# Patient Record
Sex: FEMALE | Race: WHITE | URBAN - METROPOLITAN AREA
[De-identification: names, ages, dates, MRNs, and addresses within clinical notes are randomized per-mention and may not be internally consistent; named-entity substitution may affect disease eponyms.]

---

## 2024-05-06 ENCOUNTER — OFFICE VISIT (OUTPATIENT)
Dept: OBGYN CLINIC | Facility: CLINIC | Age: 44
End: 2024-05-06

## 2024-05-06 VITALS
HEIGHT: 60 IN | WEIGHT: 143 LBS | DIASTOLIC BLOOD PRESSURE: 86 MMHG | SYSTOLIC BLOOD PRESSURE: 130 MMHG | BODY MASS INDEX: 28.07 KG/M2 | HEART RATE: 76 BPM

## 2024-05-06 DIAGNOSIS — Z59.41 FOOD INSECURITY: ICD-10-CM

## 2024-05-06 DIAGNOSIS — Z12.31 ENCOUNTER FOR SCREENING MAMMOGRAM FOR MALIGNANT NEOPLASM OF BREAST: ICD-10-CM

## 2024-05-06 DIAGNOSIS — Z60.3 LANGUAGE BARRIER: ICD-10-CM

## 2024-05-06 DIAGNOSIS — Z59.9 FINANCIAL DIFFICULTIES: ICD-10-CM

## 2024-05-06 DIAGNOSIS — Z59.82 INABILITY TO ACQUIRE TRANSPORTATION: ICD-10-CM

## 2024-05-06 DIAGNOSIS — Z01.419 ENCOUNTER FOR GYNECOLOGICAL EXAMINATION WITHOUT ABNORMAL FINDING: Primary | ICD-10-CM

## 2024-05-06 DIAGNOSIS — Z75.8 LANGUAGE BARRIER: ICD-10-CM

## 2024-05-06 DIAGNOSIS — Z30.42 ENCOUNTER FOR SURVEILLANCE OF INJECTABLE CONTRACEPTIVE: ICD-10-CM

## 2024-05-06 PROCEDURE — G0124 SCREEN C/V THIN LAYER BY MD: HCPCS | Performed by: STUDENT IN AN ORGANIZED HEALTH CARE EDUCATION/TRAINING PROGRAM

## 2024-05-06 PROCEDURE — 99396 PREV VISIT EST AGE 40-64: CPT | Performed by: NURSE PRACTITIONER

## 2024-05-06 PROCEDURE — G0476 HPV COMBO ASSAY CA SCREEN: HCPCS | Performed by: NURSE PRACTITIONER

## 2024-05-06 PROCEDURE — G0145 SCR C/V CYTO,THINLAYER,RESCR: HCPCS | Performed by: STUDENT IN AN ORGANIZED HEALTH CARE EDUCATION/TRAINING PROGRAM

## 2024-05-06 RX ORDER — MEDROXYPROGESTERONE ACETATE 150 MG/ML
150 INJECTION, SUSPENSION INTRAMUSCULAR
Qty: 1 ML | Refills: 4 | Status: SHIPPED | OUTPATIENT
Start: 2024-05-06

## 2024-05-06 SDOH — SOCIAL STABILITY - SOCIAL INSECURITY: ACCULTURATION DIFFICULTY: Z60.3

## 2024-05-06 SDOH — ECONOMIC STABILITY - FOOD INSECURITY: FOOD INSECURITY: Z59.41

## 2024-05-06 SDOH — ECONOMIC STABILITY - TRANSPORTATION SECURITY: TRANSPORTATION INSECURITY: Z59.82

## 2024-05-06 SDOH — ECONOMIC STABILITY - INCOME SECURITY: PROBLEM RELATED TO HOUSING AND ECONOMIC CIRCUMSTANCES, UNSPECIFIED: Z59.9

## 2024-05-06 NOTE — PROGRESS NOTES
ASSESSMENT & PLAN: Diana Abreu is a 44 y.o.  with normal gynecologic exam.    1.  Routine well woman exam done today  2.  Pap and HPV:  The patient's last pap and hpv was years ago, unsure.    It was normal.    Pap and cotesting was done today.    Current ASCCP Guidelines reviewed.   3.  Mammogram ordered, to be done through [facility]  4. The following were reviewed in today's visit: breast self exam, mammography screening ordered, STD testing, adequate intake of calcium and vitamin D, exercise, and healthy diet  5.  Pt desires to continue her Depo-Provera, will return to office for UPT and first injection with us.  Prescription renewed for the whole year.  She reports it has been 3 months since her last injection.     BMI Counseling: Body mass index is 28.4 kg/m². The BMI is above normal. Nutrition recommendations include encouraging healthy choices of fruits and vegetables and moderation in carbohydrate intake. Exercise recommendations include exercising 3-5 times per week. No pharmacotherapy was ordered. Rationale for BMI follow-up plan is due to patient being overweight or obese.     Depression Screening and Follow-up Plan: Patient was screened for depression during today's encounter. They screened negative with a PHQ-2 score of 0.        CC:  Annual Gynecologic Examination    HPI: Diana Abreu is a 44 y.o.  who presents for annual gynecologic examination.  She is a new patient to .  American interpretation used [id number] used..  Using depo for contraception, last injection was 3 months ago.  Reports she has been on Depo-Provera for years denies any menstrual issues reports gets monthly menses x 3 days and light  Her last MMG- pt has never had        Health Maintenance:    She wears her seatbelt routinely.    She does perform regular monthly self breast exams.    She feels safe at home.     Patient Active Problem List   Diagnosis    Encounter for gynecological examination  without abnormal finding    Encounter for screening mammogram for malignant neoplasm of breast    Encounter for surveillance of injectable contraceptive    Language barrier       History reviewed. No pertinent past medical history.    History reviewed. No pertinent surgical history.    Past OB/Gyn History:  OB History          2    Para   2    Term   2            AB        Living   2         SAB        IAB        Ectopic        Multiple        Live Births   2                  Pt does not have menstrual issues. Monthly x 3 days.  History of sexually transmitted infection: No.  History of abnormal pap smears: No .    Patient is currently sexually active.  heterosexual. The current method of family planning is Depo-Provera injections.    Family History   Problem Relation Age of Onset    No Known Problems Mother     No Known Problems Father     No Known Problems Brother     No Known Problems Brother     No Known Problems Brother     No Known Problems Maternal Grandmother     No Known Problems Maternal Grandfather     No Known Problems Paternal Grandmother     No Known Problems Paternal Grandfather     No Known Problems Daughter     No Known Problems Daughter     Throat cancer Neg Hx     Thyroid cancer Neg Hx     Breast cancer Neg Hx     Colon cancer Neg Hx     Ovarian cancer Neg Hx     Cervical cancer Neg Hx        Social History:  Social History     Socioeconomic History    Marital status: Legally      Spouse name: Not on file    Number of children: Not on file    Years of education: Not on file    Highest education level: Not on file   Occupational History    Not on file   Tobacco Use    Smoking status: Never    Smokeless tobacco: Never   Vaping Use    Vaping status: Never Used   Substance and Sexual Activity    Alcohol use: Not Currently    Drug use: Not Currently    Sexual activity: Yes     Partners: Male     Birth control/protection: Injection   Other Topics Concern    Not on file   Social  History Narrative    Not on file     Social Determinants of Health     Financial Resource Strain: Medium Risk (4/30/2024)    Overall Financial Resource Strain (CARDIA)     Difficulty of Paying Living Expenses: Somewhat hard   Food Insecurity: Food Insecurity Present (4/30/2024)    Hunger Vital Sign     Worried About Running Out of Food in the Last Year: Sometimes true     Ran Out of Food in the Last Year: Sometimes true   Transportation Needs: Unmet Transportation Needs (4/30/2024)    PRAPARE - Transportation     Lack of Transportation (Medical): Yes     Lack of Transportation (Non-Medical): Yes   Physical Activity: Not on file   Stress: Not on file   Social Connections: Not on file   Intimate Partner Violence: Not At Risk (5/6/2024)    Humiliation, Afraid, Rape, and Kick questionnaire     Fear of Current or Ex-Partner: No     Emotionally Abused: No     Physically Abused: No     Sexually Abused: No   Housing Stability: Low Risk  (5/6/2024)    Housing Stability Vital Sign     Unable to Pay for Housing in the Last Year: No     Number of Places Lived in the Last Year: 1     Unstable Housing in the Last Year: No     Presently lives with family.  Patient is .  Patient is currently employed   No Known Allergies    Current Outpatient Medications:     COLLAGEN PO, Take 1 tablet by mouth in the morning, Disp: , Rfl:     medroxyPROGESTERone (DEPO-PROVERA) 150 mg/mL injection, Inject 1 mL (150 mg total) into a muscle every 3 (three) months, Disp: 1 mL, Rfl: 4    Review of Systems:    Review of Systems  Constitutional :no fever, feels well, no tiredness, no recent weight gain or loss  ENT: no ear ache, no loss of hearing, no nosebleeds or nasal discharge, no sore throat or hoarseness.  Cardiovascular: no complaints of slow or fast heart beat, no chest pain, no palpitations, no leg claudication or lower extremity edema.  Respiratory: no complaints of shortness of shortness of breath, no ARAUJO  Breasts:no complaints of  "breast pain, breast lump, or nipple discharge  Gastrointestinal: no complaints of abdominal pain, constipation, nausea, vomiting, or diarrhea or bloody stools  Genitourinary : no complaints of dysuria, incontinence, pelvic pain, no dysmenorrhea, vaginal discharge or abnormal vaginal bleeding and as noted in HPI.  Musculoskeletal: no complaints of arthralgia, no myalgia, no joint swelling or stiffness, no limb pain or swelling.  Integumentary: no complaints of skin rash or lesion, itching or dry skin  Neurological: no complaints of headache, no confusion, no numbness or tingling, no dizziness or fainting    Objective      /86   Pulse 76   Ht 4' 11.5\" (1.511 m)   Wt 64.9 kg (143 lb)   LMP 05/03/2024 (Exact Date)   BMI 28.40 kg/m²     General:   appears stated age, cooperative, alert normal mood and affect   Neck: normal, supple,trachea midline, no masses   Heart: regular rate and rhythm, S1, S2 normal, no murmur, click, rub or gallop   Lungs: clear to auscultation bilaterally   Breasts: normal appearance, no masses or tenderness, Inspection negative, No nipple retraction or dimpling, No nipple discharge or bleeding, No axillary or supraclavicular adenopathy, Normal to palpation without dominant masses, Taught monthly breast self examination   Abdomen: soft, non-tender, without masses or organomegaly   Vulva: normal female genitalia, Bartholin's, Urethra, [place] normal   Vagina: normal vagina, no discharge, exudate, lesion, or erythema   Urethra: normal   Cervix: Normal, no discharge. PAP done. Nontender.  Her cervix is slightly flushed   Uterus: normal size, contour, position, consistency, mobility, non-tender and anteverted   Adnexa: normal adnexa and no mass, fullness, tenderness   Lymphatic palpation of lymph nodes in neck, axilla, groin and/or other locations: no lymphadenopathy or masses noted   Skin normal skin turgor and no rashes.   Psychiatric orientation to person, place, and time: normal. mood and " affect: normal

## 2024-05-07 ENCOUNTER — CLINICAL SUPPORT (OUTPATIENT)
Dept: OBGYN CLINIC | Facility: CLINIC | Age: 44
End: 2024-05-07

## 2024-05-07 DIAGNOSIS — Z30.42 ENCOUNTER FOR MANAGEMENT AND INJECTION OF DEPO-PROVERA: Primary | ICD-10-CM

## 2024-05-07 LAB
HPV HR 12 DNA CVX QL NAA+PROBE: POSITIVE
HPV16 DNA CVX QL NAA+PROBE: NEGATIVE
HPV18 DNA CVX QL NAA+PROBE: NEGATIVE

## 2024-05-07 PROCEDURE — 96372 THER/PROPH/DIAG INJ SC/IM: CPT

## 2024-05-07 RX ORDER — MEDROXYPROGESTERONE ACETATE 150 MG/ML
150 INJECTION, SUSPENSION INTRAMUSCULAR
Status: SHIPPED | OUTPATIENT
Start: 2024-05-07

## 2024-05-07 RX ADMIN — MEDROXYPROGESTERONE ACETATE 150 MG: 150 INJECTION, SUSPENSION INTRAMUSCULAR at 16:13

## 2024-05-07 NOTE — PROGRESS NOTES
Depo-Provera     [x]   Patient provided box YES   4 Refills remain  Refills submitted no  Last  Annual Date / Birth control check : 05/06/2024  Last Depo date: NA  Side effects: no  HCG: yes  if applicable: negative  Given by: Benita Villalba  Site: RUOQ    Calcium supplement daily teaching  Condoms for 2 weeks following first injection dose.

## 2024-05-09 ENCOUNTER — PATIENT OUTREACH (OUTPATIENT)
Dept: OBGYN CLINIC | Facility: CLINIC | Age: 44
End: 2024-05-09

## 2024-05-09 NOTE — PROGRESS NOTES
VANESSA OZUNA attempted to contact pt to address needs with  service ID [id number]. Pt did not answer. VANESSA OZUNA was able to leave a voicemail for a return call.

## 2024-05-13 ENCOUNTER — PATIENT OUTREACH (OUTPATIENT)
Dept: OBGYN CLINIC | Facility: CLINIC | Age: 44
End: 2024-05-13

## 2024-05-13 NOTE — PROGRESS NOTES
VANESSA OZUNA was referred to complete a SOC psych assessment to address Pt needs with  service ID [id number]. Pt is a 44 y.o.  female .     Pt immediately denied concerns for financial difficulty and expressed that she is looking to find a class for english as a second language. Pt reports that she has a car and is able to drive herself. VANESSA OZUNA educated pt about [employer] and their ESL classes. Pt reports that she is currently on their wait list and will f/u with the organization she believes she can begin classes this summer. Pt denies additional concerns at this time. VANESSA OZUNA was unable to complete the entire SOC assessment as pt denied additional concerns. VANESSA OZUNA will otherwise close this referral at this time.

## 2024-05-14 ENCOUNTER — TELEPHONE (OUTPATIENT)
Dept: OBGYN CLINIC | Facility: CLINIC | Age: 44
End: 2024-05-14

## 2024-05-14 LAB
LAB AP GYN PRIMARY INTERPRETATION: ABNORMAL
Lab: ABNORMAL
PATH INTERP SPEC-IMP: ABNORMAL

## 2024-05-14 NOTE — TELEPHONE ENCOUNTER
----- Message from RENEA Crews sent at 5/14/2024  1:49 PM EDT -----  Please call pt  inform that her pap was abnormal, it was HGSIL, she will need to be scheduled for a colposcopy, please call pt to inform and schedule.  Thank You

## 2024-05-14 NOTE — TELEPHONE ENCOUNTER
ID [id number]    Attempted to call, left a message asking for patient to call the office regarding test results and recommendations. Office number provided in message

## 2024-05-16 NOTE — TELEPHONE ENCOUNTER
ID [id number]    Attempted to call, left a message asking for patient to call the office regarding test results and reocmmendations. Will send my chart message and certified letter.

## 2024-05-23 PROBLEM — Z12.31 ENCOUNTER FOR SCREENING MAMMOGRAM FOR MALIGNANT NEOPLASM OF BREAST: Status: RESOLVED | Noted: 2024-05-06 | Resolved: 2024-05-23

## 2024-05-23 PROBLEM — Z01.419 ENCOUNTER FOR GYNECOLOGICAL EXAMINATION WITHOUT ABNORMAL FINDING: Status: RESOLVED | Noted: 2024-05-06 | Resolved: 2024-05-23

## 2024-05-29 ENCOUNTER — PROCEDURE VISIT (OUTPATIENT)
Dept: OBGYN CLINIC | Facility: CLINIC | Age: 44
End: 2024-05-29

## 2024-05-29 VITALS
SYSTOLIC BLOOD PRESSURE: 121 MMHG | HEART RATE: 87 BPM | WEIGHT: 141 LBS | DIASTOLIC BLOOD PRESSURE: 82 MMHG | BODY MASS INDEX: 27.68 KG/M2 | RESPIRATION RATE: 18 BRPM | HEIGHT: 60 IN

## 2024-05-29 DIAGNOSIS — R87.613 HGSIL (HIGH GRADE SQUAMOUS INTRAEPITHELIAL LESION) ON PAP SMEAR OF CERVIX: Primary | ICD-10-CM

## 2024-05-29 PROCEDURE — 88305 TISSUE EXAM BY PATHOLOGIST: CPT | Performed by: PATHOLOGY

## 2024-05-29 PROCEDURE — 88344 IMHCHEM/IMCYTCHM EA MLT ANTB: CPT | Performed by: PATHOLOGY

## 2024-05-29 PROCEDURE — 57454 BX/CURETT OF CERVIX W/SCOPE: CPT | Performed by: OBSTETRICS & GYNECOLOGY

## 2024-05-29 NOTE — PROGRESS NOTES
"   Colposcopy     Date/Time  5/29/2024 3:30 PM     Universal Protocol   Consent: Verbal consent obtained. Written consent obtained.  Risks and benefits: risks, benefits and alternatives were discussed  Consent given by: patient  Time out: Immediately prior to procedure a \"time out\" was called to verify the correct patient, procedure, equipment, support staff and site/side marked as required.  Patient understanding: patient states understanding of the procedure being performed  Patient consent: the patient's understanding of the procedure matches consent given  Procedure consent: procedure consent matches procedure scheduled  Patient identity confirmed: verbally with patient     Performed by  Divya Wray MD   Authorized by  Divya Wray MD     Pre-procedure details      Pre-procedure timeout performed: yes      Prepped with: acetic acid     Indication    HSIL   Procedure Details   Procedure: Colposcopy w/ cervical biopsy and ECC      Under satisfactory analgesia the patient was prepped and draped in the dorsal lithotomy position: yes      [place] speculum was placed in the vagina: yes      Under colposcopic examination the transition zone was seen in entirety: yes      Intracervical block was performed: no      Endocervix was curetted using a Kevorkian curette: yes      Cervical biopsy performed with a cervical biopsy punch: no      Tampon inserted: no      Monsel's solution was applied: yes      Biopsy(s): yes      Location:  Atthe cevical 6 o clock and 12 o clock position    Specimen to pathology: yes     Post-procedure      Findings: Bleeding      Impression: High grade cervical dysplasia      Patient tolerance of procedure:  Tolerated well, no immediate complications         "

## 2024-06-04 ENCOUNTER — TELEPHONE (OUTPATIENT)
Dept: OTHER | Facility: OTHER | Age: 44
End: 2024-06-04

## 2024-06-04 ENCOUNTER — TELEPHONE (OUTPATIENT)
Dept: OBGYN CLINIC | Facility: CLINIC | Age: 44
End: 2024-06-04

## 2024-06-04 PROCEDURE — 88344 IMHCHEM/IMCYTCHM EA MLT ANTB: CPT | Performed by: PATHOLOGY

## 2024-06-04 PROCEDURE — 88305 TISSUE EXAM BY PATHOLOGIST: CPT | Performed by: PATHOLOGY

## 2024-06-04 NOTE — TELEPHONE ENCOUNTER
ID [id number]      Attempted to call, left a message asking for patient to call the office regarding an appointment. Office number provided in message.

## 2024-06-04 NOTE — TELEPHONE ENCOUNTER
Utilized language line .    Patient calling in to state that the office just left her a VM to call back this number back to go over results. Reviewed today's telephone encounter from office staff. Patient denies having any clinical symptoms or any further assistance.  Advised to call office again tomorrow during office hours. Patient verbalized understanding.     Please contact patient back tomorrow morning.

## 2024-06-04 NOTE — TELEPHONE ENCOUNTER
----- Message from Divya Wray MD sent at 6/4/2024  4:11 PM EDT -----  Please schedule with GYN resident to discuss colpo results and possible surgery     TY  ----- Message -----  From: Lab, Background User  Sent: 6/4/2024  11:21 AM EDT  To: Divya Wray MD

## 2024-06-13 ENCOUNTER — OFFICE VISIT (OUTPATIENT)
Dept: OBGYN CLINIC | Facility: CLINIC | Age: 44
End: 2024-06-13

## 2024-06-13 VITALS
HEIGHT: 60 IN | WEIGHT: 140 LBS | DIASTOLIC BLOOD PRESSURE: 80 MMHG | HEART RATE: 74 BPM | SYSTOLIC BLOOD PRESSURE: 113 MMHG | RESPIRATION RATE: 18 BRPM | BODY MASS INDEX: 27.48 KG/M2

## 2024-06-13 DIAGNOSIS — Z23 NEED FOR HPV VACCINATION: Primary | ICD-10-CM

## 2024-06-13 DIAGNOSIS — R87.613 HGSIL ON CYTOLOGIC SMEAR OF CERVIX: ICD-10-CM

## 2024-06-13 DIAGNOSIS — Z59.9 FINANCIAL DIFFICULTIES: ICD-10-CM

## 2024-06-13 PROCEDURE — 90651 9VHPV VACCINE 2/3 DOSE IM: CPT | Performed by: OBSTETRICS & GYNECOLOGY

## 2024-06-13 PROCEDURE — 99213 OFFICE O/P EST LOW 20 MIN: CPT | Performed by: OBSTETRICS & GYNECOLOGY

## 2024-06-13 PROCEDURE — 90471 IMMUNIZATION ADMIN: CPT | Performed by: OBSTETRICS & GYNECOLOGY

## 2024-06-13 RX ORDER — MEDROXYPROGESTERONE ACETATE 150 MG/ML
INJECTION, SUSPENSION INTRAMUSCULAR
COMMUNITY
Start: 2024-05-06

## 2024-06-13 SDOH — ECONOMIC STABILITY - INCOME SECURITY: PROBLEM RELATED TO HOUSING AND ECONOMIC CIRCUMSTANCES, UNSPECIFIED: Z59.9

## 2024-06-13 NOTE — PROGRESS NOTES
[facility]  SURGICAL CONSULTATION    SUBJECTIVE:  HPI: Diana Abreu is a 44 y.o.  female with a history of abnormal pap smear and colposcopy biopsy who presents to discuss results and management. Pap smear performed on 24 was positive for HGSIL, HPV other and subsequent colposcopy indicated high grade dysplasia. Today we discussed the implications of these results and increased risk of developing cervical cancer. We discussed management options of cervical dysplasia including Loop electro surgical excision procedure and cold knife cervical conization. Cervical excision procedure has been recommended for diagnostic and therapeutic benefits. Immediate procedural risks include excessive bleeding with need for transfusion, surgical site infection and damage to surrounding structures (vagina, bowel, bladder) with the potential for exploratory surgery to correct any identified damages. She does not desire future pregnancy but was counseled on risks to future pregnancies, if pursued, may include a shortened cervix,  labor, premature rupture of membranes and  delivery. Cervical stenosis may occur after the surgical site is healed. Additional post procedure surveillance as well as potential for repeat procedure in the future discussed. Risks of anesthesia also reviewed.     Patient would like more time to think about her decision and will contact  when she makes final decision. She was informed that delaying excision might lead to progression of the dysplasia. She expressed understanding and states that her primary concern is financial cost of the procedure and she had multiple bills from the colposcopy. She was provided application for financial assistance.          History reviewed. No pertinent past medical history.    OB History    Para Term  AB Living   2 2 2     2   SAB IAB Ectopic Multiple Live Births           2      # Outcome Date GA Lbr Ibrahima/2nd  "Weight Sex Type Anes PTL Lv   2 Term 06/05/11    F Vag-Spont   KAYLA   1 Term 12/16/00    F Vag-Spont   KAYLA       History reviewed. No pertinent surgical history.    Social History     Tobacco Use    Smoking status: Never    Smokeless tobacco: Never   Vaping Use    Vaping status: Never Used   Substance Use Topics    Alcohol use: Never    Drug use: Never         Current Outpatient Medications:     COLLAGEN PO, Take 1 tablet by mouth in the morning, Disp: , Rfl:     MAGNESIUM PO, Take by mouth, Disp: , Rfl:     medroxyPROGESTERone (DEPO-PROVERA) 150 mg/mL injection, Inject 1 mL (150 mg total) into a muscle every 3 (three) months, Disp: 1 mL, Rfl: 4    medroxyPROGESTERone acetate (DEPO-PROVERA SYRINGE) 150 mg/mL injection, INJECT 1ML INTRAMUSCULARLY EVERY 3 MONTHS (Patient not taking: Reported on 6/13/2024), Disp: , Rfl:     Current Facility-Administered Medications:     medroxyPROGESTERone acetate (DEPO-PROVERA SYRINGE) IM injection 150 mg, 150 mg, Intramuscular, Q3 Months, , 150 mg at 05/07/24 1613    OBJECTIVE:  Vitals:    06/13/24 1341   BP: 113/80   BP Location: Right arm   Patient Position: Sitting   Cuff Size: Adult   Pulse: 74   Resp: 18   Weight: 63.5 kg (140 lb)   Height: 4' 11.5\" (1.511 m)       Physical Exam  Vitals and nursing note reviewed.   Constitutional:       General: She is not in acute distress.     Appearance: She is well-developed.   HENT:      Head: Normocephalic and atraumatic.   Eyes:      Extraocular Movements: Extraocular movements intact.   Cardiovascular:      Rate and Rhythm: Normal rate.      Pulses: Normal pulses.   Pulmonary:      Effort: Pulmonary effort is normal. No respiratory distress.      Breath sounds: Normal breath sounds.   Musculoskeletal:         General: No swelling.      Cervical back: Normal range of motion.   Skin:     General: Skin is warm and dry.   Neurological:      Mental Status: She is alert.   Psychiatric:         Mood and Affect: Mood normal. "         ASSESSMENT:  Diana Abreu is a 44 y.o.  female with hx of abnormal pap smear  presented today to discuss results of colposcopy , which was significant for high grade dysplasia. Patient is undecided on the LEEP procedure that was recommended    No results found for this or any previous visit (from the past 12 hour(s)).    PLAN:  - High grade cervical dysplasia: LEEP recommended, patient undecided at this time due to financial concerns. She was provided financial application information and referral to financial counselor.         Discussed with Dr. Neymar Desai MD   PGY-2, OBGYN  24

## 2024-06-20 ENCOUNTER — PATIENT OUTREACH (OUTPATIENT)
Dept: OBGYN CLINIC | Facility: CLINIC | Age: 44
End: 2024-06-20

## 2024-06-20 PROBLEM — R87.613 HGSIL ON CYTOLOGIC SMEAR OF CERVIX: Status: ACTIVE | Noted: 2024-06-20

## 2024-06-20 NOTE — PROGRESS NOTES
VANESSA OZUNA contacted pt regarding financial difficulty. Pt is a 45 yo female primary language is Mongolian. VANESSA OZUNA used  service ID [id number].     Pt reports that she had financial concerns from a bill she received from [employer]. Pt reports that she received the phone number for her to call regarding the bill. Pt denies additional concerns.     VANESSA OZUNA encouraged pt to contact VANESSA OZUNA for additional support if needed. VANESSA OZUNA will otherwise close this referral at this time.

## 2024-09-16 ENCOUNTER — OFFICE VISIT (OUTPATIENT)
Dept: OBGYN CLINIC | Facility: CLINIC | Age: 44
End: 2024-09-16

## 2024-09-16 VITALS
HEART RATE: 79 BPM | HEIGHT: 60 IN | BODY MASS INDEX: 28.54 KG/M2 | SYSTOLIC BLOOD PRESSURE: 112 MMHG | DIASTOLIC BLOOD PRESSURE: 75 MMHG | WEIGHT: 145.4 LBS

## 2024-09-16 DIAGNOSIS — Z60.3 LANGUAGE BARRIER: ICD-10-CM

## 2024-09-16 DIAGNOSIS — Z30.09 ENCOUNTER FOR COUNSELING REGARDING CONTRACEPTION: Primary | ICD-10-CM

## 2024-09-16 DIAGNOSIS — Z75.8 LANGUAGE BARRIER: ICD-10-CM

## 2024-09-16 DIAGNOSIS — R87.613 HGSIL ON CYTOLOGIC SMEAR OF CERVIX: ICD-10-CM

## 2024-09-16 LAB — SL AMB POCT URINE HCG: NORMAL

## 2024-09-16 PROCEDURE — 99213 OFFICE O/P EST LOW 20 MIN: CPT | Performed by: NURSE PRACTITIONER

## 2024-09-16 PROCEDURE — 96372 THER/PROPH/DIAG INJ SC/IM: CPT | Performed by: NURSE PRACTITIONER

## 2024-09-16 PROCEDURE — 81025 URINE PREGNANCY TEST: CPT | Performed by: NURSE PRACTITIONER

## 2024-09-16 RX ADMIN — MEDROXYPROGESTERONE ACETATE 150 MG: 150 INJECTION, SUSPENSION INTRAMUSCULAR at 16:00

## 2024-09-16 SDOH — SOCIAL STABILITY - SOCIAL INSECURITY: ACCULTURATION DIFFICULTY: Z60.3

## 2024-09-16 NOTE — PROGRESS NOTES
Depo-Provera Via  # [id number]    [x]   Patient provided box yes   3 Refills remain  Refills submitted no  Last  Annual Date / Birth control check : 05/06/2024  Last Depo date: 05/07/2024  Side effects: N/A  HCG: yes  if applicable: negative  Given by: Shanice Schwarz  Site: left deltoid     Calcium supplement daily teaching  Condoms for 2 weeks following first injection dose.

## 2024-09-16 NOTE — PROGRESS NOTES
Ambulatory Visit  Name: Diana Abreu      : 1980      MRN: [id number]  Encounter Provider: RENEA Lindsey  Encounter Date: 2024   Encounter department: [facility]    Appt with Doctors 24 to  discuss surgery.   Assessment & Plan  Encounter for counseling regarding contraception  Pt desires to restart Depo, last injection 24  Injection given today, UPT is negative.          History of Present Illness     Diana Abreu is a 44 y.o. female who presents for Depo restart, last Depo was 2024. She has brought in  her Depo injection   used  [id number].   LMP 9/15/24, UPT is negative.    HGSIL Pap 2024, she had a colposcopy 24 that showed ECC, fragment at least LG HUNTER, ANNE MARIE-1 cannot rule out focal high-grade.  Cervical biopsy ANNE MARIE-1 cannot rule out focal high-grade.  Patient was counseled on 2024 for LEEP, cold knife cone patient wanted to think about her decision and will contact social work when she makes her final decision, was concerned with bills from colposcopy, has met with financial assistance    To speak with Jamey today since pt has not had surgery and concerned with costs. Pt states she has questions about surgery and wants to review with doctors, appt made for next week 24 to discuss.       Review of Systems   Constitutional:  Negative for chills and fever.   Respiratory: Negative.     Cardiovascular: Negative.    Genitourinary:  Negative for menstrual problem.           Objective     There were no vitals taken for this visit.    Physical Exam  Constitutional:       Appearance: Normal appearance.   Cardiovascular:      Rate and Rhythm: Normal rate.   Pulmonary:      Effort: Pulmonary effort is normal.

## 2024-09-26 ENCOUNTER — OFFICE VISIT (OUTPATIENT)
Dept: OBGYN CLINIC | Facility: CLINIC | Age: 44
End: 2024-09-26

## 2024-09-26 VITALS
DIASTOLIC BLOOD PRESSURE: 87 MMHG | HEIGHT: 60 IN | RESPIRATION RATE: 18 BRPM | BODY MASS INDEX: 28.86 KG/M2 | HEART RATE: 73 BPM | SYSTOLIC BLOOD PRESSURE: 129 MMHG | WEIGHT: 147 LBS

## 2024-09-26 DIAGNOSIS — N87.0 CIN I (CERVICAL INTRAEPITHELIAL NEOPLASIA I): Primary | ICD-10-CM

## 2024-09-26 PROCEDURE — 99213 OFFICE O/P EST LOW 20 MIN: CPT | Performed by: OBSTETRICS & GYNECOLOGY

## 2024-09-26 NOTE — PROGRESS NOTES
"OB/GYN VISIT  Diana Abreu  10/7/2024  7:36 PM     [id number]    ASSESSMENT / PLAN:    Diana Abreu is a 44 y.o.  female presenting for discussion of LEEP. 24 pap HGSIL, HPV Other positive. 24 colposcopy revealed ANNE MARIE I, but cannot rule out focal high grade dysplasia from 12 oclock and ECC. All risks and benefits were reviewed. Reviewed and signed surgical consent with the patient.         SUBJECTIVE:    Diana Abreu is a 44 y.o.  female presenting for discussion of management of her abnormal pap smear and colposcopy results, as listed above. She was very concerned about payment for this surgery. We discussed that this procedure is very important and will serve as therapeutic and diagnostic. We discussed the risk of carcinoma in situ or the progression to cancer in the future.     History reviewed. No pertinent past medical history.    History reviewed. No pertinent surgical history.    OBJECTIVE:    Vitals:  Blood pressure 129/87, pulse 73, resp. rate 18, height 4' 11.5\" (1.511 m), weight 66.7 kg (147 lb), last menstrual period 09/15/2024.Body mass index is 29.19 kg/m².    Physical Exam:    Physical Exam  Constitutional:       Appearance: Normal appearance.   HENT:      Head: Normocephalic and atraumatic.      Mouth/Throat:      Mouth: Mucous membranes are moist.      Pharynx: Oropharynx is clear.   Eyes:      General: No scleral icterus.     Extraocular Movements: Extraocular movements intact.   Cardiovascular:      Rate and Rhythm: Normal rate and regular rhythm.      Pulses: Normal pulses.   Pulmonary:      Effort: Pulmonary effort is normal. No respiratory distress.      Breath sounds: Normal breath sounds.   Abdominal:      Palpations: Abdomen is soft.      Tenderness: There is no abdominal tenderness.   Musculoskeletal:         General: No tenderness.      Right lower leg: No edema.      Left lower leg: No edema.   Neurological:      " General: No focal deficit present.      Mental Status: She is alert and oriented to person, place, and time.   Skin:     General: Skin is warm and dry.   Psychiatric:      Comments: anxious   Vitals reviewed.               Quita Joseph MD  PGY-2 OB/GYN

## 2024-10-03 ENCOUNTER — DOCUMENTATION (OUTPATIENT)
Dept: OBGYN CLINIC | Facility: CLINIC | Age: 44
End: 2024-10-03

## 2024-10-03 NOTE — PROGRESS NOTES
[facility] Surgical Case Review  Date Reviewed: 10/03/24  Reviewed by: Jessica Mclaughlin, Dr. Mcrae  Case request: EUA, ELAINE, ECC  Indication: HSIL pap, at least CIN1 colpo (cannot rule out high grade dysplasia)  Surgical Consent: 9/26/24 MA30/31: not indicated    Case request approved: yes    Comments:  N/A        Kaylah Candelaria MD  PGY-4, OBGYN  10/03/24

## 2024-10-03 NOTE — PROGRESS NOTES
Called pt with   to set up LEEP  procedure   she need to speak to financial department first  which is going to call and set up   She will call back when ready to set up procedure       Please  let me know when she calls office  and is ready

## 2024-10-15 PROBLEM — N87.0 CIN I (CERVICAL INTRAEPITHELIAL NEOPLASIA I): Status: ACTIVE | Noted: 2024-10-15

## 2024-12-06 ENCOUNTER — CLINICAL SUPPORT (OUTPATIENT)
Dept: OBGYN CLINIC | Facility: CLINIC | Age: 44
End: 2024-12-06

## 2024-12-06 DIAGNOSIS — Z30.42 ENCOUNTER FOR MANAGEMENT AND INJECTION OF DEPO-PROVERA: Primary | ICD-10-CM

## 2024-12-06 LAB — SL AMB POCT URINE HCG: NEGATIVE

## 2024-12-06 PROCEDURE — 81025 URINE PREGNANCY TEST: CPT

## 2024-12-06 PROCEDURE — 96372 THER/PROPH/DIAG INJ SC/IM: CPT

## 2024-12-06 RX ADMIN — MEDROXYPROGESTERONE ACETATE 150 MG: 150 INJECTION, SUSPENSION INTRAMUSCULAR at 11:03

## 2024-12-06 NOTE — PROGRESS NOTES
Depo-Provera     [x]   Patient provided box YES   2 Refills remain  Refills submitted no  Last  Annual Date / Birth control check : 05/07/2024  Last Depo date: 09/16/2024  Side effects: no  HCG: yes  if applicable: negative  Given by: Benita Villalba MA   Site: LD    Calcium supplement daily teaching  Condoms for 2 weeks following first injection dose.

## 2025-02-25 ENCOUNTER — ANESTHESIA (OUTPATIENT)
Dept: ANESTHESIOLOGY | Facility: HOSPITAL | Age: 45
End: 2025-02-25

## 2025-02-25 ENCOUNTER — ANESTHESIA EVENT (OUTPATIENT)
Dept: ANESTHESIOLOGY | Facility: HOSPITAL | Age: 45
End: 2025-02-25

## 2025-02-26 ENCOUNTER — CLINICAL SUPPORT (OUTPATIENT)
Dept: OBGYN CLINIC | Facility: CLINIC | Age: 45
End: 2025-02-26

## 2025-02-26 DIAGNOSIS — Z30.42 ENCOUNTER FOR MANAGEMENT AND INJECTION OF DEPO-PROVERA: Primary | ICD-10-CM

## 2025-02-26 PROCEDURE — 96372 THER/PROPH/DIAG INJ SC/IM: CPT

## 2025-02-26 RX ADMIN — MEDROXYPROGESTERONE ACETATE 150 MG: 150 INJECTION, SUSPENSION INTRAMUSCULAR at 14:32

## 2025-02-26 NOTE — PROGRESS NOTES
Depo-Provera     [x]   Patient provided box yes   1 Refills remain  Refills submitted no  Last  Annual Date / Birth control check : 09/16/24  Last Depo date: 12/06/24  Side effects: no  HCG: no  if applicable:   Given by: Laurymar Reyes  Site: left  deltoid     Calcium supplement daily teaching  Condoms for 2 weeks following first injection dose.

## 2025-03-04 RX ORDER — IBUPROFEN 400 MG/1
TABLET, FILM COATED ORAL EVERY 6 HOURS PRN
COMMUNITY

## 2025-03-10 ENCOUNTER — TELEPHONE (OUTPATIENT)
Age: 45
End: 2025-03-10

## 2025-03-10 NOTE — TELEPHONE ENCOUNTER
Patient of Dr. Cutler calling in with question about upcoming surgery. Connected with [facility]  [id number]. She is a patient of [facility] - offered to warm transfer, however patient prefers phone number to office. Phone number provided and patient states she will call.

## 2025-03-11 ENCOUNTER — ANESTHESIA (OUTPATIENT)
Dept: PERIOP | Facility: AMBULARY SURGERY CENTER | Age: 45
End: 2025-03-11

## 2025-03-11 ENCOUNTER — HOSPITAL ENCOUNTER (OUTPATIENT)
Facility: AMBULARY SURGERY CENTER | Age: 45
Setting detail: OUTPATIENT SURGERY
Discharge: HOME | End: 2025-03-11
Attending: OBSTETRICS & GYNECOLOGY | Admitting: OBSTETRICS & GYNECOLOGY

## 2025-03-11 ENCOUNTER — ANESTHESIA EVENT (OUTPATIENT)
Dept: PERIOP | Facility: AMBULARY SURGERY CENTER | Age: 45
End: 2025-03-11

## 2025-03-11 VITALS
DIASTOLIC BLOOD PRESSURE: 78 MMHG | OXYGEN SATURATION: 99 % | HEART RATE: 78 BPM | RESPIRATION RATE: 18 BRPM | SYSTOLIC BLOOD PRESSURE: 133 MMHG | TEMPERATURE: 98.6 F | HEIGHT: 60 IN | WEIGHT: 142 LBS | BODY MASS INDEX: 27.88 KG/M2

## 2025-03-11 DIAGNOSIS — D06.9 CIN III (CERVICAL INTRAEPITHELIAL NEOPLASIA GRADE III) WITH SEVERE DYSPLASIA: ICD-10-CM

## 2025-03-11 DIAGNOSIS — R87.619 ABNORMAL CERVICAL PAPANICOLAOU SMEAR, UNSPECIFIED ABNORMAL PAP FINDING: ICD-10-CM

## 2025-03-11 DIAGNOSIS — R87.613 HIGH GRADE SQUAMOUS INTRAEPITHELIAL CERVICAL DYSPLASIA: ICD-10-CM

## 2025-03-11 PROBLEM — Z98.890 S/P LEEP: Status: ACTIVE | Noted: 2025-03-11

## 2025-03-11 LAB
EXT PREGNANCY TEST URINE: NEGATIVE
EXT. CONTROL: NORMAL

## 2025-03-11 PROCEDURE — 88307 TISSUE EXAM BY PATHOLOGIST: CPT | Performed by: PATHOLOGY

## 2025-03-11 PROCEDURE — NC001 PR NO CHARGE: Performed by: OBSTETRICS & GYNECOLOGY

## 2025-03-11 PROCEDURE — 88344 IMHCHEM/IMCYTCHM EA MLT ANTB: CPT | Performed by: PATHOLOGY

## 2025-03-11 PROCEDURE — 81025 URINE PREGNANCY TEST: CPT | Performed by: OBSTETRICS & GYNECOLOGY

## 2025-03-11 RX ORDER — PROPOFOL 10 MG/ML
INJECTION, EMULSION INTRAVENOUS CONTINUOUS PRN
Status: DISCONTINUED | OUTPATIENT
Start: 2025-03-11 | End: 2025-03-11

## 2025-03-11 RX ORDER — LIDOCAINE HYDROCHLORIDE AND EPINEPHRINE BITARTRATE 20; .01 MG/ML; MG/ML
INJECTION, SOLUTION SUBCUTANEOUS AS NEEDED
Status: DISCONTINUED | OUTPATIENT
Start: 2025-03-11 | End: 2025-03-11 | Stop reason: HOSPADM

## 2025-03-11 RX ORDER — ONDANSETRON 2 MG/ML
INJECTION INTRAMUSCULAR; INTRAVENOUS AS NEEDED
Status: DISCONTINUED | OUTPATIENT
Start: 2025-03-11 | End: 2025-03-11

## 2025-03-11 RX ORDER — KETAMINE HYDROCHLORIDE 100 MG/ML
INJECTION, SOLUTION INTRAMUSCULAR; INTRAVENOUS AS NEEDED
Status: DISCONTINUED | OUTPATIENT
Start: 2025-03-11 | End: 2025-03-11

## 2025-03-11 RX ORDER — LIDOCAINE HYDROCHLORIDE 20 MG/ML
INJECTION, SOLUTION EPIDURAL; INFILTRATION; INTRACAUDAL; PERINEURAL AS NEEDED
Status: DISCONTINUED | OUTPATIENT
Start: 2025-03-11 | End: 2025-03-11 | Stop reason: HOSPADM

## 2025-03-11 RX ORDER — FENTANYL CITRATE 50 UG/ML
INJECTION, SOLUTION INTRAMUSCULAR; INTRAVENOUS AS NEEDED
Status: DISCONTINUED | OUTPATIENT
Start: 2025-03-11 | End: 2025-03-11

## 2025-03-11 RX ORDER — IBUPROFEN 600 MG/1
600 TABLET, FILM COATED ORAL EVERY 6 HOURS PRN
Status: DISCONTINUED | OUTPATIENT
Start: 2025-03-11 | End: 2025-03-11 | Stop reason: HOSPADM

## 2025-03-11 RX ORDER — MIDAZOLAM HYDROCHLORIDE 2 MG/2ML
INJECTION, SOLUTION INTRAMUSCULAR; INTRAVENOUS AS NEEDED
Status: DISCONTINUED | OUTPATIENT
Start: 2025-03-11 | End: 2025-03-11

## 2025-03-11 RX ORDER — FENTANYL CITRATE/PF 50 MCG/ML
50 SYRINGE (ML) INJECTION
Status: DISCONTINUED | OUTPATIENT
Start: 2025-03-11 | End: 2025-03-11 | Stop reason: HOSPADM

## 2025-03-11 RX ORDER — OXYCODONE HYDROCHLORIDE 5 MG/1
5 TABLET ORAL EVERY 4 HOURS PRN
Status: DISCONTINUED | OUTPATIENT
Start: 2025-03-11 | End: 2025-03-11 | Stop reason: HOSPADM

## 2025-03-11 RX ORDER — ONDANSETRON 2 MG/ML
4 INJECTION INTRAMUSCULAR; INTRAVENOUS ONCE AS NEEDED
Status: DISCONTINUED | OUTPATIENT
Start: 2025-03-11 | End: 2025-03-11 | Stop reason: HOSPADM

## 2025-03-11 RX ORDER — SODIUM CHLORIDE, SODIUM LACTATE, POTASSIUM CHLORIDE, CALCIUM CHLORIDE 600; 310; 30; 20 MG/100ML; MG/100ML; MG/100ML; MG/100ML
INJECTION, SOLUTION INTRAVENOUS CONTINUOUS PRN
Status: DISCONTINUED | OUTPATIENT
Start: 2025-03-11 | End: 2025-03-11

## 2025-03-11 RX ORDER — ACETAMINOPHEN 325 MG/1
975 TABLET ORAL EVERY 6 HOURS PRN
Status: DISCONTINUED | OUTPATIENT
Start: 2025-03-11 | End: 2025-03-11 | Stop reason: HOSPADM

## 2025-03-11 RX ORDER — DEXAMETHASONE SODIUM PHOSPHATE 10 MG/ML
INJECTION, SOLUTION INTRAMUSCULAR; INTRAVENOUS AS NEEDED
Status: DISCONTINUED | OUTPATIENT
Start: 2025-03-11 | End: 2025-03-11

## 2025-03-11 RX ORDER — SODIUM CHLORIDE, SODIUM LACTATE, POTASSIUM CHLORIDE, CALCIUM CHLORIDE 600; 310; 30; 20 MG/100ML; MG/100ML; MG/100ML; MG/100ML
100 INJECTION, SOLUTION INTRAVENOUS CONTINUOUS
Status: DISCONTINUED | OUTPATIENT
Start: 2025-03-11 | End: 2025-03-11 | Stop reason: HOSPADM

## 2025-03-11 RX ADMIN — DEXAMETHASONE SODIUM PHOSPHATE 70 MG: 10 INJECTION INTRAMUSCULAR; INTRAVENOUS at 10:16

## 2025-03-11 RX ADMIN — KETAMINE HYDROCHLORIDE 10 MG: 100 INJECTION INTRAMUSCULAR; INTRAVENOUS at 10:26

## 2025-03-11 RX ADMIN — DEXAMETHASONE SODIUM PHOSPHATE 10 MG: 10 INJECTION INTRAMUSCULAR; INTRAVENOUS at 10:21

## 2025-03-11 RX ADMIN — MIDAZOLAM 2 MG: 1 INJECTION INTRAMUSCULAR; INTRAVENOUS at 10:11

## 2025-03-11 RX ADMIN — KETAMINE HYDROCHLORIDE 20 MG: 100 INJECTION INTRAMUSCULAR; INTRAVENOUS at 10:16

## 2025-03-11 RX ADMIN — FENTANYL CITRATE 25 MCG: 50 INJECTION INTRAMUSCULAR; INTRAVENOUS at 10:16

## 2025-03-11 RX ADMIN — SODIUM CHLORIDE, SODIUM LACTATE, POTASSIUM CHLORIDE, AND CALCIUM CHLORIDE: .6; .31; .03; .02 INJECTION, SOLUTION INTRAVENOUS at 10:12

## 2025-03-11 RX ADMIN — PROPOFOL 80 MCG/KG/MIN: 10 INJECTION, EMULSION INTRAVENOUS at 10:16

## 2025-03-11 RX ADMIN — ONDANSETRON 4 MG: 2 INJECTION INTRAMUSCULAR; INTRAVENOUS at 10:21

## 2025-03-11 RX ADMIN — FENTANYL CITRATE 25 MCG: 50 INJECTION INTRAMUSCULAR; INTRAVENOUS at 10:30

## 2025-03-11 NOTE — OP NOTE
OPERATIVE REPORT  PATIENT NAME: Diana Abreu    :  1980  MRN: [id number]  Pt Location: [facility] OR [facility]OM 06    SURGERY DATE: 3/11/2025    Surgeons and Role:     * Jose Cutler MD - Primary     * Fiorella Sinclair MD - Assisting    Preop Diagnosis:  Abnormal cervical Papanicolaou smear, unspecified abnormal pap finding [R87.619]  High grade squamous intraepithelial cervical dysplasia [R87.613]    Post-Op Diagnosis Codes:     * Abnormal cervical Papanicolaou smear, unspecified abnormal pap finding [R87.619]     * High grade squamous intraepithelial cervical dysplasia [R87.613]    Procedure(s):  BIOPSY LEEP CERVIX  AND EXAM UNDER  ANESTHESIA WITH ECC    Specimen(s):  ID Type Source Tests Collected by Time Destination   1 : LEEP- stitch marked at 12 o clock. Tissue Soft Tissue, Other TISSUE EXAM Jose Cutler MD 3/11/2025 1040    2 : LEEP at 6 o clock Tissue Soft Tissue, Other TISSUE EXAM Jose Cutler MD 3/11/2025 1041    3 : Endocervical currettings Tissue Endocervical TISSUE EXAM Jose Cutler MD 3/11/2025 1042        Estimated Blood Loss:   Minimal    Anesthesia Type:   TIVA    Operative Indications:  Abnormal cervical Papanicolaou smear, unspecified abnormal pap finding [R87.619]  High grade squamous intraepithelial cervical dysplasia [R87.613]    Operative Findings:  Normal appearing external female genitalia  Cervix flush with vagina with no surface lesions  Hemostatic at conclusion of procedure    Complications:   None apparent    Procedure and Technique:  Indications for the procedure:   History: Ms Diana Abreu is a 45 y.o. year  female presenting for discussion of LEEP. 24 pap HGSIL, HPV Other positive. 24 colposcopy revealed ANNE MARIE I, but cannot rule out focal high grade dysplasia from 12 oclock and ECC     Surgical risks: The patient was informed of all the risks and benefits of a loop electrosurgical excision procedure.  Risks included but were not limited to  bleeding, infection, injury to the vulva, vagina, cervix, uterine perforation, or negative effects on future pregnancy outcomes.  The patient expressed understanding the risks involved, all portions were answered, the patient was consented to the procedure.    Description of the procedure:    The patient was taken to the operating room. IV sedation was administered and found to be adequate. The patient was then positioned on the operating table in dorsolithotomy position with legs supported by stirrups and SCDs bilaterally. All pressure points were padded. Warm blanket was placed to maintain control of core body temperature. The patient was then prepped and draped in usual sterile fashion.    Operative technique: A timeout was performed to confirm correct patient and correct procedure.  A bivalved coated speculum was inserted into the vagina and the cervix was visualized. Local anesthesia of mixture of 1% Lidocaine and 2% Lidocaine with epinephrine was injected at 12,3,6, and 0 o'clock positions, 20 milliliters total. The 25 x 10 mm loop electrode was selected and used to excise the cervical sample and was marked at 12 o'clock and sent to pathology. A top hat was done using a 10 mm x 10 mm loop and sent to pathology. Ball electrocautery was used to obtain adequate hemostasis. Monsel's was then applied to maintain hemostasis. Good hemostasis was confirmed. The bivalve speculum was removed from the vagina.    At the completion of the procedure all needle, sponge, instrument counts were noted to be correct ×2. Dr. Cutler was present for all key portions of the procedure.    Patient Disposition:  PACU      SIGNATURE: Fiorella Sinclair MD  DATE: March 11, 2025  TIME: 10:44 AM

## 2025-03-11 NOTE — ANESTHESIA PREPROCEDURE EVALUATION
Procedure:  BIOPSY LEEP CERVIX  AND EXAM UNDER  ANESTHESIA WITH ECC (Cervix)    Relevant Problems   No relevant active problems   Reports some nasal congestion currently related to allergies       Meds:  Ibuprofen Thursday    METS:  Can walk 2 blocks    NPO?:  Last night at 9pm    Physical Exam    Airway    Mallampati score: II         Dental   No notable dental hx     Cardiovascular      Pulmonary      Other Findings  post-pubertal.      Anesthesia Plan  ASA Score- 2     Anesthesia Type- IV sedation with anesthesia with ASA Monitors.         Additional Monitors:     Airway Plan:     Comment: Richard [id number] .       Plan Factors-Exercise tolerance (METS): >4 METS.    Chart reviewed.    Patient summary reviewed.                  Induction- intravenous.    Postoperative Plan-         Informed Consent- Anesthetic plan and risks discussed with patient.  I personally reviewed this patient with the CRNA. Discussed and agreed on the Anesthesia Plan with the CRNA..      NPO Status:  No vitals data found for the desired time range.

## 2025-03-11 NOTE — ANESTHESIA POSTPROCEDURE EVALUATION
Post-Op Assessment Note    CV Status:  Stable    Pain management: adequate       Mental Status:  Sleepy   Hydration Status:  Euvolemic   PONV Controlled:  Controlled   Airway Patency:  Patent  Two or more mitigation strategies used for obstructive sleep apnea   Post Op Vitals Reviewed: Yes    No anethesia notable event occurred.    Staff: CRNA, Anesthesiologist           Last Filed PACU Vitals:  Vitals Value Taken Time   Temp     Pulse     BP     Resp     SpO2

## 2025-03-11 NOTE — ANESTHESIA POSTPROCEDURE EVALUATION
Post-Op Assessment Note    CV Status:  Stable    Pain management: adequate       Mental Status:  Alert and awake   Hydration Status:  Euvolemic   PONV Controlled:  Controlled   Airway Patency:  Patent     Post Op Vitals Reviewed: Yes    No anethesia notable event occurred.    Staff: Anesthesiologist, CRNA           Last Filed PACU Vitals:  Vitals Value Taken Time   Temp 97.5 °F (36.4 °C) 03/11/25 1113   Pulse 96 03/11/25 1113   /91 03/11/25 1113   Resp 20 03/11/25 1113   SpO2 100 % 03/11/25 1113       Modified Nakul:     Vitals Value Taken Time   Activity 2 03/11/25 1100   Respiration 2 03/11/25 1100   Circulation 2 03/11/25 1100   Consciousness 2 03/11/25 1100   Oxygen Saturation 2 03/11/25 1100     Modified Nakul Score: 10

## 2025-03-11 NOTE — H&P
H&P Exam - Gynecology  Diana Abreu 45 y.o. female MRN: [id number]  Unit/Bed#: OR POOL Encounter: [id number]    Assessment & Plan   To OR for LEEP     HPI:  Diana Abreu is a 45 y.o. female who presents for LEEP for pap with HGSIL, HPV other positive and colposcopy with ANNE MARIE I, but cannot rule out focal high grade dysplasia from 12 oclock and ECC. She reports nothing has changed since being seen in the office in 2024.    Accepts blood products if indicated: yes  Accepts all life-saving measures including intubation & resuscitation with compressions: yes    Review of Systems   Constitutional:  Negative for chills.   Eyes:  Negative for visual disturbance.   Respiratory:  Negative for chest tightness and shortness of breath.    Cardiovascular:  Negative for chest pain and palpitations.   Gastrointestinal:  Negative for abdominal pain.   Genitourinary:  Negative for vaginal bleeding, vaginal discharge and vaginal pain.   Musculoskeletal:  Negative for back pain.   Neurological:  Negative for headaches.       Historical Information   History reviewed. No pertinent past medical history.  Past Surgical History:   Procedure Laterality Date    NO PAST SURGERIES       OB History    Para Term  AB Living   2 2 2   2   SAB IAB Ectopic Multiple Live Births       2      # Outcome Date GA Lbr Ibrahima/2nd Weight Sex Type Anes PTL Lv   2 Term 11    F Vag-Spont   AKYLA   1 Term 00    F Vag-Spont   KAYLA     Family History   Problem Relation Age of Onset    No Known Problems Mother     No Known Problems Father     No Known Problems Brother     No Known Problems Brother     No Known Problems Brother     No Known Problems Maternal Grandmother     No Known Problems Maternal Grandfather     No Known Problems Paternal Grandmother     No Known Problems Paternal Grandfather     No Known Problems Daughter     No Known Problems Daughter     Throat cancer Neg Hx     Thyroid cancer Neg Hx     Breast  "cancer Neg Hx     Colon cancer Neg Hx     Ovarian cancer Neg Hx     Cervical cancer Neg Hx      Social History   Social History     Substance and Sexual Activity   Alcohol Use Never     Social History     Substance and Sexual Activity   Drug Use Never     Social History     Tobacco Use   Smoking Status Never   Smokeless Tobacco Never       Meds/Allergies     Facility-Administered Medications Prior to Admission:     medroxyPROGESTERone acetate (DEPO-PROVERA SYRINGE) IM injection 150 mg    Medications Prior to Admission:     COLLAGEN PO    ibuprofen (MOTRIN) 400 mg tablet    MAGNESIUM PO    Multiple Vitamins-Minerals (CENTRUM ADULTS PO)    medroxyPROGESTERone (DEPO-PROVERA) 150 mg/mL injection    medroxyPROGESTERone acetate (DEPO-PROVERA SYRINGE) 150 mg/mL injection  No Known Allergies    Objective     Vitals:  /87   Pulse 60   Temp (!) 96.8 °F (36 °C) (Temporal)   Resp 18   Ht 5' (1.524 m)   Wt 64.4 kg (142 lb)   LMP  (LMP Unknown)   SpO2 98%   BMI 27.73 kg/m²     No intake/output data recorded.  No intake/output data recorded.    No results found for: \"WBC\", \"HGB\", \"HCT\", \"MCV\", \"PLT\"    No results found for: \"GLUCOSE\", \"CALCIUM\", \"NA\", \"K\", \"CO2\", \"CL\", \"BUN\", \"CREATININE\"    Physical Exam  Constitutional:       Appearance: She is normal weight.   HENT:      Head: Normocephalic.   Eyes:      Conjunctiva/sclera: Conjunctivae normal.   Cardiovascular:      Rate and Rhythm: Normal rate.      Pulses: Normal pulses.   Pulmonary:      Effort: Pulmonary effort is normal.   Abdominal:      Palpations: Abdomen is soft.      Tenderness: There is no abdominal tenderness.   Skin:     General: Skin is warm.   Neurological:      Mental Status: She is alert and oriented to person, place, and time.   Psychiatric:         Mood and Affect: Mood normal.       Code Status: No Order        Fiorella Sinclair MD  3/11/2025  9:48 AM    "

## 2025-03-11 NOTE — OP NOTE
OPERATIVE REPORT  PATIENT NAME: Diana Abreu    :  1980  MRN: [id number]  Pt Location[facility]    SURGERY DATE: 3/11/2025    Surgeons and Role:     * Jose Cutler MD - Primary     * Fiorella Sinclair MD - Assisting    Preop Diagnosis:  Abnormal cervical Papanicolaou smear, unspecified abnormal pap finding [R87.619]  High grade squamous intraepithelial cervical dysplasia [R87.613]    Post-Op Diagnosis Codes:     * Abnormal cervical Papanicolaou smear, unspecified abnormal pap finding [R87.619]     * High grade squamous intraepithelial cervical dysplasia [R87.613]    Procedure(s):  BIOPSY LEEP CERVIX  AND EXAM UNDER  ANESTHESIA WITH ECC    Specimen(s):  * No specimens in log *    Estimated Blood Loss:   Minimal    Drains:  * No LDAs found *    Anesthesia Type:   Choice    Operative Indications:  Abnormal cervical Papanicolaou smear, unspecified abnormal pap finding [R87.619]  High grade squamous intraepithelial cervical dysplasia [R87.613]  ***    Operative Findings:  ***      Complications:   {Post Op Complications:81994}    Procedure and Technique:  ***   {Op note attestation:40946}    Patient Disposition:  {op note disposition:71800}             SIGNATURE: Fiorella Sinclair MD  DATE: 2025  TIME: 10:17 AM

## 2025-03-20 PROCEDURE — 88344 IMHCHEM/IMCYTCHM EA MLT ANTB: CPT | Performed by: PATHOLOGY

## 2025-03-20 PROCEDURE — 88307 TISSUE EXAM BY PATHOLOGIST: CPT | Performed by: PATHOLOGY

## 2025-03-25 ENCOUNTER — OFFICE VISIT (OUTPATIENT)
Dept: OBGYN CLINIC | Facility: CLINIC | Age: 45
End: 2025-03-25

## 2025-03-25 ENCOUNTER — RESULTS FOLLOW-UP (OUTPATIENT)
Dept: OTHER | Facility: HOSPITAL | Age: 45
End: 2025-03-25

## 2025-03-25 VITALS
DIASTOLIC BLOOD PRESSURE: 79 MMHG | HEIGHT: 60 IN | WEIGHT: 150.4 LBS | SYSTOLIC BLOOD PRESSURE: 110 MMHG | BODY MASS INDEX: 29.53 KG/M2 | HEART RATE: 81 BPM

## 2025-03-25 DIAGNOSIS — Z98.890 S/P LEEP: ICD-10-CM

## 2025-03-25 DIAGNOSIS — Z98.890 POST-OPERATIVE STATE: Primary | ICD-10-CM

## 2025-03-26 NOTE — ASSESSMENT & PLAN NOTE
Healing well from LEEP   SVE declined but minimal pain. No bleeding   Reviewed results. Stressed importance of close surveillance and possible need for future procedures  Recommend repeat Pap with HPV co-testing in 6 months   RTC then or sooner if clinically indicated

## 2025-03-26 NOTE — PROGRESS NOTES
Name: Diana Abreu      : 1980      MRN: [id number]  Encounter Provider: Quita Joseph MD  Encounter Date: 3/25/2025   Encounter department: [facility]  :   used     Assessment & Plan  S/P LEEP  Healing well from LEEP   SVE declined but minimal pain. No bleeding   Reviewed results. Stressed importance of close surveillance and possible need for future procedures  Recommend repeat Pap with HPV co-testing in 6 months   RTC then or sooner if clinically indicated        Post-operative state  See s/p LEEP            History of Present Illness     Diana Abreu is a 45 y.o. female who presents for post op check up from LEEP and discussion of pathology. Discussed high grade precancer on excision. Discussed some positive margins and cautery applied after sample is removed. Stressed importance of close surveillance in coming motsh and years to make sure HSIL doesn't progress or return.     Patient has had minimal pain and no bleeding. She experienced some pain when returning to work as .       History obtained from: patient    Review of Systems   Constitutional:  Negative for chills and fever.   HENT:  Negative for congestion, rhinorrhea, sneezing and sore throat.    Eyes:  Negative for photophobia and visual disturbance.   Respiratory:  Negative for cough and shortness of breath.    Cardiovascular:  Negative for chest pain.   Gastrointestinal:  Negative for diarrhea, nausea and vomiting.   Genitourinary:  Negative for dysuria, frequency, vaginal bleeding, vaginal discharge and vaginal pain.   Neurological:  Negative for dizziness, numbness and headaches.   Psychiatric/Behavioral: Negative.       Current Outpatient Medications on File Prior to Visit   Medication Sig Dispense Refill    COLLAGEN PO Take 1 tablet by mouth in the morning      ibuprofen (MOTRIN) 400 mg tablet Take by mouth every 6 (six) hours as needed for  "mild pain      medroxyPROGESTERone (DEPO-PROVERA) 150 mg/mL injection Inject 1 mL (150 mg total) into a muscle every 3 (three) months 1 mL 4    Multiple Vitamins-Minerals (CENTRUM ADULTS PO) Take by mouth      MAGNESIUM PO Take by mouth (Patient not taking: Reported on 3/25/2025)      medroxyPROGESTERone acetate (DEPO-PROVERA SYRINGE) 150 mg/mL injection INJECT 1ML INTRAMUSCULARLY EVERY 3 MONTHS (Patient not taking: Reported on 6/13/2024)       Current Facility-Administered Medications on File Prior to Visit   Medication Dose Route Frequency Provider Last Rate Last Admin    medroxyPROGESTERone acetate (DEPO-PROVERA SYRINGE) IM injection 150 mg  150 mg Intramuscular Q3 Months    150 mg at 02/26/25 1432      Social History     Tobacco Use    Smoking status: Never    Smokeless tobacco: Never   Vaping Use    Vaping status: Never Used   Substance and Sexual Activity    Alcohol use: Never    Drug use: Never    Sexual activity: Yes     Partners: Female     Birth control/protection: Injection     Comment: Quiero cambiar de método        Objective   /79 (BP Location: Right arm, Patient Position: Sitting, Cuff Size: Adult)   Pulse 81   Ht 4' 11.5\" (1.511 m)   Wt 68.2 kg (150 lb 6.4 oz)   LMP  (LMP Unknown)   BMI 29.87 kg/m²      Physical Exam  Vitals reviewed. Exam conducted with a chaperone present.   Constitutional:       General: She is not in acute distress.     Appearance: She is well-developed.   HENT:      Head: Normocephalic and atraumatic.      Mouth/Throat:      Pharynx: Oropharynx is clear.   Eyes:      Conjunctiva/sclera: Conjunctivae normal.   Cardiovascular:      Rate and Rhythm: Normal rate and regular rhythm.      Pulses: Normal pulses.   Pulmonary:      Effort: Pulmonary effort is normal. No respiratory distress.      Breath sounds: Normal breath sounds.   Abdominal:      Palpations: Abdomen is soft.      Tenderness: There is no abdominal tenderness. There is no guarding or rebound. "   Genitourinary:     Comments: SVE declined  Musculoskeletal:         General: No swelling.      Cervical back: Neck supple.   Skin:     General: Skin is warm and dry.      Capillary Refill: Capillary refill takes less than 2 seconds.   Neurological:      General: No focal deficit present.      Mental Status: She is alert and oriented to person, place, and time.   Psychiatric:         Mood and Affect: Mood normal.         Quita Joseph MD   PGY-2 OB/GYN

## 2025-03-31 PROBLEM — Z30.09 ENCOUNTER FOR COUNSELING REGARDING CONTRACEPTION: Status: RESOLVED | Noted: 2024-09-16 | Resolved: 2025-03-31

## 2025-05-14 ENCOUNTER — CLINICAL SUPPORT (OUTPATIENT)
Dept: OBGYN CLINIC | Facility: CLINIC | Age: 45
End: 2025-05-14

## 2025-05-14 DIAGNOSIS — Z30.42 ENCOUNTER FOR SURVEILLANCE OF INJECTABLE CONTRACEPTIVE: Primary | ICD-10-CM

## 2025-05-14 DIAGNOSIS — Z30.42 ENCOUNTER FOR MANAGEMENT AND INJECTION OF DEPO-PROVERA: Primary | ICD-10-CM

## 2025-05-14 RX ORDER — MEDROXYPROGESTERONE ACETATE 150 MG/ML
INJECTION, SUSPENSION INTRAMUSCULAR
Qty: 1 ML | Refills: 0 | Status: SHIPPED | OUTPATIENT
Start: 2025-05-14

## 2025-05-14 RX ADMIN — MEDROXYPROGESTERONE ACETATE 150 MG: 150 INJECTION, SUSPENSION INTRAMUSCULAR at 15:38

## 2025-05-14 NOTE — PROGRESS NOTES
Depo-Provera     [x]   Patient provided box    0 Refills remain  Refills submitted yes  Last  Annual Date / Birth control check : 5/6/24  Last Depo date: 2/26/25  Side effects: no  HCG: no  if applicable:   Given by: LINCOLN Monroe  Site: Left Deltoid    Calcium supplement daily teaching  Condoms for 2 weeks following first injection dose.

## 2025-08-08 DIAGNOSIS — Z30.42 ENCOUNTER FOR SURVEILLANCE OF INJECTABLE CONTRACEPTIVE: ICD-10-CM

## 2025-08-08 RX ORDER — MEDROXYPROGESTERONE ACETATE 150 MG/ML
150 INJECTION, SUSPENSION INTRAMUSCULAR
Qty: 1 ML | Refills: 1 | Status: SHIPPED | OUTPATIENT
Start: 2025-08-08

## 2025-08-13 ENCOUNTER — CLINICAL SUPPORT (OUTPATIENT)
Dept: OBGYN CLINIC | Facility: CLINIC | Age: 45
End: 2025-08-13

## (undated) DEVICE — PAD SANITARY

## (undated) DEVICE — NEEDLE SPINAL 22G X 3.5IN  QUINCKE

## (undated) DEVICE — MEDI-VAC TUBING CONNECTOR 6-IN-1 STRAIGHT: Brand: CARDINAL HEALTH

## (undated) DEVICE — STERILE LUBRICATING JELLY, TUBE: Brand: HR LUBRICATING JELLY

## (undated) DEVICE — X-RAY DETECTABLE SPONGES,16 PLY: Brand: VISTEC

## (undated) DEVICE — GLOVE SRG BIOGEL ORTHOPEDIC 7.5

## (undated) DEVICE — SYRINGE 20ML LL

## (undated) DEVICE — ELECTRODE BALL E-Z CLEAN 2IN -0015

## (undated) DEVICE — SPONGE STICK WITH PVP-I: Brand: KENDALL

## (undated) DEVICE — ELECTROSURGICAL ELECTRODE EXTENDER: Brand: CONMED

## (undated) DEVICE — Device

## (undated) DEVICE — STERILE 8 INCH PROCTO SWAB: Brand: CARDINAL HEALTH

## (undated) DEVICE — SURGICEL 2 X 3